# Patient Record
Sex: FEMALE | URBAN - METROPOLITAN AREA
[De-identification: names, ages, dates, MRNs, and addresses within clinical notes are randomized per-mention and may not be internally consistent; named-entity substitution may affect disease eponyms.]

---

## 2018-01-22 ENCOUNTER — APPOINTMENT (OUTPATIENT)
Dept: URBAN - METROPOLITAN AREA CLINIC 211 | Age: 28
Setting detail: DERMATOLOGY
End: 2018-01-22

## 2018-01-22 NOTE — HPI: OTHER
Condition:: Cosmetic
Please Describe Your Condition:: Pt present today for Chemical Peel . Pain is 0/10.